# Patient Record
Sex: FEMALE | Race: WHITE | NOT HISPANIC OR LATINO | URBAN - METROPOLITAN AREA
[De-identification: names, ages, dates, MRNs, and addresses within clinical notes are randomized per-mention and may not be internally consistent; named-entity substitution may affect disease eponyms.]

---

## 2018-12-31 ENCOUNTER — OFFICE VISIT (OUTPATIENT)
Dept: URBAN - METROPOLITAN AREA CLINIC 76 | Facility: CLINIC | Age: 68
End: 2018-12-31
Payer: MEDICARE

## 2018-12-31 DIAGNOSIS — H35.373 PUCKERING OF MACULA, BILATERAL: ICD-10-CM

## 2018-12-31 PROCEDURE — 99213 OFFICE O/P EST LOW 20 MIN: CPT | Performed by: OPHTHALMOLOGY

## 2018-12-31 PROCEDURE — 92134 CPTRZ OPH DX IMG PST SGM RTA: CPT | Performed by: OPHTHALMOLOGY

## 2018-12-31 ASSESSMENT — INTRAOCULAR PRESSURE
OD: 13
OS: 16

## 2018-12-31 NOTE — IMPRESSION/PLAN
Impression: Macular cyst, hole, or pseudohole, right eye: H35.341. Lamellar hole OD Plan: OCT ordered and performed today. Discussed diagnosis with patient. The clinical exam was consistent with lamellar hole. The diagnosis and natural history and prognosis of the partial macular hole as well as the risks and benefits with the surgical procedure. Given the current visual acuity  the patient elects to observe for now.

## 2018-12-31 NOTE — IMPRESSION/PLAN
Impression: Drusen (degenerative) of macula, bilateral: H35.363. Plan: OCT ordered and performed today. Discussed diagnosis with patient, OCT demonstrates the lack of SRF or CME. Treatment is not recommended at this time but we will continue to monitor frequently. Patient is advised to check vision one eye at a time while reading daily. Patient understands and agrees with plan.

## 2018-12-31 NOTE — IMPRESSION/PLAN
Impression: Puckering of macula, bilateral: H35.373. Plan: OCT ordered and performed today. Discussed diagnosis with patient. The clinical exam was consistent with Epiretinal membrane. The diagnosis and natural history and prognosis of Epiretinal membrane, as well as the risks and benefits with Vitrectomy with membrane peeling were discussed. Given the current visual acuity  the patient elects to observe for now.

## 2019-03-15 ENCOUNTER — OFFICE VISIT (OUTPATIENT)
Dept: URBAN - METROPOLITAN AREA CLINIC 76 | Facility: CLINIC | Age: 69
End: 2019-03-15
Payer: MEDICARE

## 2019-03-15 DIAGNOSIS — H35.341 MACULAR CYST, HOLE, OR PSEUDOHOLE, RIGHT EYE: Primary | ICD-10-CM

## 2019-03-15 DIAGNOSIS — H35.363 DRUSEN (DEGENERATIVE) OF MACULA, BILATERAL: ICD-10-CM

## 2019-03-15 PROCEDURE — 92134 CPTRZ OPH DX IMG PST SGM RTA: CPT | Performed by: OPHTHALMOLOGY

## 2019-03-15 PROCEDURE — 99213 OFFICE O/P EST LOW 20 MIN: CPT | Performed by: OPHTHALMOLOGY

## 2019-03-15 ASSESSMENT — INTRAOCULAR PRESSURE
OS: 14
OD: 11

## 2019-03-22 ENCOUNTER — TESTING ONLY (OUTPATIENT)
Dept: URBAN - METROPOLITAN AREA CLINIC 76 | Facility: CLINIC | Age: 69
End: 2019-03-22

## 2019-03-22 DIAGNOSIS — H52.4 PRESBYOPIA: Primary | ICD-10-CM

## 2019-03-22 ASSESSMENT — KERATOMETRY
OS: 46.50
OD: 44.13

## 2019-03-22 ASSESSMENT — VISUAL ACUITY
OS: 20/25
OD: 20/30

## 2020-02-28 ENCOUNTER — OFFICE VISIT (OUTPATIENT)
Dept: URBAN - METROPOLITAN AREA CLINIC 76 | Facility: CLINIC | Age: 70
End: 2020-02-28
Payer: MEDICARE

## 2020-02-28 PROCEDURE — 99213 OFFICE O/P EST LOW 20 MIN: CPT | Performed by: OPHTHALMOLOGY

## 2020-02-28 PROCEDURE — 92134 CPTRZ OPH DX IMG PST SGM RTA: CPT | Performed by: OPHTHALMOLOGY

## 2020-02-28 ASSESSMENT — INTRAOCULAR PRESSURE
OD: 12
OS: 16

## 2020-02-28 NOTE — IMPRESSION/PLAN
Impression: Drusen (degenerative) of macula, bilateral: H35.363. Plan:  Discussed diagnosis with patient,  Treatment is not recommended at this time but we will continue to monitor frequently. Patient is advised to check vision one eye at a time while reading daily. Recommend appt W/ [facility] for new MRx. Patient understands and agrees with plan.

## 2020-05-12 ENCOUNTER — OFFICE VISIT (OUTPATIENT)
Dept: URBAN - METROPOLITAN AREA CLINIC 76 | Facility: CLINIC | Age: 70
End: 2020-05-12
Payer: COMMERCIAL

## 2020-05-12 DIAGNOSIS — H10.45 OTHER CHRONIC ALLERGIC CONJUNCTIVITIS: ICD-10-CM

## 2020-05-12 PROCEDURE — 92014 COMPRE OPH EXAM EST PT 1/>: CPT | Performed by: OPTOMETRIST

## 2020-05-12 ASSESSMENT — KERATOMETRY
OS: 46.50
OD: 44.13

## 2020-05-12 ASSESSMENT — VISUAL ACUITY
OS: 20/30
OD: 20/40

## 2020-05-12 ASSESSMENT — INTRAOCULAR PRESSURE
OS: 16
OD: 14

## 2020-05-12 NOTE — IMPRESSION/PLAN
Impression: Other chronic allergic conjunctivitis: H10.45. Bilateral. Plan: Discussed diagnosis with patient. Recommend OTC oral antihistamine. Continue Alaway up to TID OU. Or can try Pataday QD OU. Rub excess into lids. Recommend refrigerating drops.

## 2020-05-12 NOTE — IMPRESSION/PLAN
Impression: Macular cyst, hole, or pseudohole, right eye: H35.341. Right. Plan: Under care of Dr. Augustus Baugh.

## 2020-05-12 NOTE — IMPRESSION/PLAN
Impression: Drusen (degenerative) of macula, bilateral: H35.363. Bilateral. Plan: Under care of Dr. Clarke Section.

## 2020-05-12 NOTE — IMPRESSION/PLAN
Impression: Presbyopia: H52.4. Bilateral. Plan: Discussed condition. New mrx given today. Advised against contacts. Pt to call with any concerns.

## 2021-03-31 ENCOUNTER — OFFICE VISIT (OUTPATIENT)
Dept: URBAN - METROPOLITAN AREA CLINIC 76 | Facility: CLINIC | Age: 71
End: 2021-03-31
Payer: COMMERCIAL

## 2021-03-31 DIAGNOSIS — Z96.1 PRESENCE OF INTRAOCULAR LENS: ICD-10-CM

## 2021-03-31 PROCEDURE — 92014 COMPRE OPH EXAM EST PT 1/>: CPT | Performed by: OPTOMETRIST

## 2021-03-31 ASSESSMENT — KERATOMETRY
OD: 44.38
OS: 46.63

## 2021-03-31 ASSESSMENT — INTRAOCULAR PRESSURE
OD: 11
OS: 15

## 2021-03-31 ASSESSMENT — VISUAL ACUITY
OD: 20/40
OS: 20/25

## 2021-03-31 NOTE — IMPRESSION/PLAN
Impression: Drusen (degenerative) of macula, bilateral: H35.363. Bilateral. Plan: Under care of Dr. Randall Reyez.

## 2021-03-31 NOTE — IMPRESSION/PLAN
Impression: Macular cyst, hole, or pseudohole, right eye: H35.341. Right. Plan: Under care of Dr. Bo Martinez.

## 2021-04-09 ENCOUNTER — OFFICE VISIT (OUTPATIENT)
Dept: URBAN - METROPOLITAN AREA CLINIC 76 | Facility: CLINIC | Age: 71
End: 2021-04-09
Payer: MEDICARE

## 2021-04-09 PROCEDURE — 99213 OFFICE O/P EST LOW 20 MIN: CPT | Performed by: OPHTHALMOLOGY

## 2021-04-09 PROCEDURE — 92134 CPTRZ OPH DX IMG PST SGM RTA: CPT | Performed by: OPHTHALMOLOGY

## 2021-04-09 ASSESSMENT — INTRAOCULAR PRESSURE
OD: 13
OS: 13

## 2021-04-09 NOTE — IMPRESSION/PLAN
Impression: Macular cyst, hole, or pseudohole, right eye: H35.341. Right. Plan: OCT ordered and performed today. Discussed diagnosis in detail with patient. No treatment is required at this time. Advised patient of condition. Will continue to observe condition and or symptoms. Recommend patient return in 3 mths for re-eval. Call if [facility] worsens.

## 2021-07-16 ENCOUNTER — OFFICE VISIT (OUTPATIENT)
Dept: URBAN - METROPOLITAN AREA CLINIC 76 | Facility: CLINIC | Age: 71
End: 2021-07-16
Payer: MEDICARE

## 2021-07-16 PROCEDURE — 99213 OFFICE O/P EST LOW 20 MIN: CPT | Performed by: OPHTHALMOLOGY

## 2021-07-16 PROCEDURE — 92134 CPTRZ OPH DX IMG PST SGM RTA: CPT | Performed by: OPHTHALMOLOGY

## 2021-07-16 ASSESSMENT — INTRAOCULAR PRESSURE
OD: 15
OS: 15

## 2021-07-16 NOTE — IMPRESSION/PLAN
Impression: Macular cyst, hole, or pseudohole, right eye: H35.341. Right.
lameller hole Plan: OCT ordered and performed today. Discussed diagnosis in detail with patient. No treatment is required at this time. Advised patient of condition. Will continue to observe condition and or symptoms. Recommend patient return in 6 mths for re-eval. Call if [address] worsens.

## 2022-01-28 ENCOUNTER — OFFICE VISIT (OUTPATIENT)
Dept: URBAN - METROPOLITAN AREA CLINIC 76 | Facility: CLINIC | Age: 72
End: 2022-01-28
Payer: MEDICARE

## 2022-01-28 PROCEDURE — 99214 OFFICE O/P EST MOD 30 MIN: CPT | Performed by: OPHTHALMOLOGY

## 2022-01-28 PROCEDURE — 92134 CPTRZ OPH DX IMG PST SGM RTA: CPT | Performed by: OPHTHALMOLOGY

## 2022-01-28 ASSESSMENT — KERATOMETRY
OD: 44.63
OS: 46.38

## 2022-01-28 ASSESSMENT — INTRAOCULAR PRESSURE
OS: 13
OD: 10

## 2022-01-28 NOTE — IMPRESSION/PLAN
Impression: Macular cyst, hole, or pseudohole, right eye: H35.341. Plan: OCT ordered and performed today. Discussed diagnosis with patient. The clinical exam was consistent with Epiretinal membrane. The diagnosis and natural history and prognosis of Epiretinal membrane, as well as the risks and benefits with Vitrectomy with membrane peeling were discussed. Given the current visual acuity  the patient elects to observe for now.

## 2022-09-12 ENCOUNTER — OFFICE VISIT (OUTPATIENT)
Dept: URBAN - METROPOLITAN AREA CLINIC 76 | Facility: CLINIC | Age: 72
End: 2022-09-12
Payer: COMMERCIAL

## 2022-09-12 DIAGNOSIS — Z96.1 PRESENCE OF INTRAOCULAR LENS: ICD-10-CM

## 2022-09-12 DIAGNOSIS — H52.4 PRESBYOPIA: Primary | ICD-10-CM

## 2022-09-12 DIAGNOSIS — H35.341 MACULAR CYST, HOLE, OR PSEUDOHOLE, RIGHT EYE: ICD-10-CM

## 2022-09-12 PROCEDURE — 92014 COMPRE OPH EXAM EST PT 1/>: CPT | Performed by: OPTOMETRIST

## 2022-09-12 ASSESSMENT — INTRAOCULAR PRESSURE
OD: 11
OS: 14

## 2022-09-12 ASSESSMENT — VISUAL ACUITY
OD: 20/30
OS: 20/20

## 2022-09-12 ASSESSMENT — KERATOMETRY
OS: 46.50
OD: 44.38

## 2022-09-12 NOTE — IMPRESSION/PLAN
Impression: Macular cyst, hole, or pseudohole, right eye: H35.341. Right. Plan: Discussed. Continue to monitor w/o tx.

## 2023-03-10 ENCOUNTER — OFFICE VISIT (OUTPATIENT)
Dept: URBAN - METROPOLITAN AREA CLINIC 76 | Facility: CLINIC | Age: 73
End: 2023-03-10
Payer: MEDICARE

## 2023-03-10 DIAGNOSIS — H35.341 MACULAR CYST, HOLE, OR PSEUDOHOLE, RIGHT EYE: Primary | ICD-10-CM

## 2023-03-10 PROCEDURE — 92134 CPTRZ OPH DX IMG PST SGM RTA: CPT | Performed by: OPHTHALMOLOGY

## 2023-03-10 PROCEDURE — 99214 OFFICE O/P EST MOD 30 MIN: CPT | Performed by: OPHTHALMOLOGY

## 2023-03-10 ASSESSMENT — INTRAOCULAR PRESSURE
OD: 15
OS: 15

## 2023-03-10 NOTE — IMPRESSION/PLAN
Impression: Macular cyst, hole, or pseudohole, right eye: H35.341. Plan: Appreciable foveal outer layer atrophy w/ very faint EMM. Not sure that an ILM peel would help at this time. Looks almost like a previous spontaneously closed macular hole. + Metamorphopsia, however. Will re-eval again in 3 months. Should [facility] decline and/or traction on OCT increase plan PPV at that time.

## 2023-07-28 ENCOUNTER — OFFICE VISIT (OUTPATIENT)
Dept: URBAN - METROPOLITAN AREA CLINIC 76 | Facility: CLINIC | Age: 73
End: 2023-07-28
Payer: MEDICARE

## 2023-07-28 DIAGNOSIS — H35.341 MACULAR CYST, HOLE, OR PSEUDOHOLE, RIGHT EYE: Primary | ICD-10-CM

## 2023-07-28 PROCEDURE — 92134 CPTRZ OPH DX IMG PST SGM RTA: CPT | Performed by: OPHTHALMOLOGY

## 2023-07-28 PROCEDURE — 99214 OFFICE O/P EST MOD 30 MIN: CPT | Performed by: OPHTHALMOLOGY

## 2023-07-28 ASSESSMENT — INTRAOCULAR PRESSURE
OS: 14
OD: 14

## 2024-01-22 ENCOUNTER — OFFICE VISIT (OUTPATIENT)
Dept: URBAN - METROPOLITAN AREA CLINIC 76 | Facility: CLINIC | Age: 74
End: 2024-01-22
Payer: MEDICARE

## 2024-01-22 DIAGNOSIS — H35.341 MACULAR CYST, HOLE, OR PSEUDOHOLE, RIGHT EYE: Primary | ICD-10-CM

## 2024-01-22 PROCEDURE — 92134 CPTRZ OPH DX IMG PST SGM RTA: CPT | Performed by: OPHTHALMOLOGY

## 2024-01-22 PROCEDURE — 99214 OFFICE O/P EST MOD 30 MIN: CPT | Performed by: OPHTHALMOLOGY

## 2024-01-22 ASSESSMENT — INTRAOCULAR PRESSURE
OS: 12
OD: 10

## 2024-04-05 ENCOUNTER — OFFICE VISIT (OUTPATIENT)
Dept: URBAN - METROPOLITAN AREA CLINIC 76 | Facility: CLINIC | Age: 74
End: 2024-04-05
Payer: MEDICARE

## 2024-04-05 DIAGNOSIS — H35.341 MACULAR CYST, HOLE, OR PSEUDOHOLE, RIGHT EYE: Primary | ICD-10-CM

## 2024-04-05 PROCEDURE — 92134 CPTRZ OPH DX IMG PST SGM RTA: CPT | Performed by: OPHTHALMOLOGY

## 2024-04-05 PROCEDURE — 99214 OFFICE O/P EST MOD 30 MIN: CPT | Performed by: OPHTHALMOLOGY

## 2024-04-05 ASSESSMENT — INTRAOCULAR PRESSURE
OS: 13
OD: 11

## 2024-05-31 ENCOUNTER — OFFICE VISIT (OUTPATIENT)
Dept: URBAN - METROPOLITAN AREA CLINIC 76 | Facility: CLINIC | Age: 74
End: 2024-05-31
Payer: MEDICARE

## 2024-05-31 DIAGNOSIS — H35.341 MACULAR CYST, HOLE, OR PSEUDOHOLE, RIGHT EYE: Primary | ICD-10-CM

## 2024-05-31 PROCEDURE — 92134 CPTRZ OPH DX IMG PST SGM RTA: CPT | Performed by: OPHTHALMOLOGY

## 2024-05-31 PROCEDURE — 99214 OFFICE O/P EST MOD 30 MIN: CPT | Performed by: OPHTHALMOLOGY

## 2024-05-31 ASSESSMENT — INTRAOCULAR PRESSURE
OS: 18
OD: 15

## 2024-10-02 ENCOUNTER — OFFICE VISIT (OUTPATIENT)
Dept: URBAN - METROPOLITAN AREA CLINIC 76 | Facility: CLINIC | Age: 74
End: 2024-10-02
Payer: COMMERCIAL

## 2024-10-02 DIAGNOSIS — H52.4 PRESBYOPIA: Primary | ICD-10-CM

## 2024-10-02 DIAGNOSIS — G24.9 DYSTONIA, UNSPECIFIED: ICD-10-CM

## 2024-10-02 PROCEDURE — 92012 INTRM OPH EXAM EST PATIENT: CPT | Performed by: OPTOMETRIST

## 2024-10-02 ASSESSMENT — INTRAOCULAR PRESSURE
OS: 18
OD: 15

## 2024-10-02 ASSESSMENT — VISUAL ACUITY: OD: 20/50

## 2024-10-02 ASSESSMENT — KERATOMETRY
OS: 46.38
OD: 44.38